# Patient Record
Sex: FEMALE | Race: WHITE
[De-identification: names, ages, dates, MRNs, and addresses within clinical notes are randomized per-mention and may not be internally consistent; named-entity substitution may affect disease eponyms.]

---

## 2018-08-23 ENCOUNTER — HOSPITAL ENCOUNTER (OUTPATIENT)
Dept: HOSPITAL 45 - C.MAMM | Age: 23
Discharge: HOME | End: 2018-08-23
Attending: PHYSICIAN ASSISTANT
Payer: COMMERCIAL

## 2018-08-23 DIAGNOSIS — R92.2: Primary | ICD-10-CM

## 2018-08-23 DIAGNOSIS — N64.4: ICD-10-CM

## 2018-08-23 NOTE — MAMMOGRAPHY REPORT
ULTRASOUND OF RIGHT BREAST: 8/23/2018

CLINICAL HISTORY: The patient reports a palpable lump in the right breast since the beginning of Augu
st. The lump was tender when she first felt it.  





COMPARISON: No prior exams were available for comparison.   



Findings: Real-time, high-resolution targeted ultrasound was performed of the area of the palpable cali
mp pointed out by the patient, in the right breast at approximately 3:00, 5 cm from the nipple.  Ultr
asound was also performed of the right 4 to 5:00 breast.  Sonographically normal tissue is seen in th
preet regions, without evidence of a mass or other suspicious sonographic abnormality.









IMPRESSION: ACR BI-RADS CATEGORY 1: NEGATIVE 

No suspicious sonographic abnormality at the site of the palpable lump and pain pointed out by the pa
tient in the right breast. There is no sonographic evidence of malignancy.  Recommend clinical follow
-up.



The patient was verbally notified of the results.



Kathleen Garcia M.D.  

ah/:8/23/2018 10:13:54  



Imaging Technologist: Kathleen Garcia MD, [facility]

letter sent: Normal 1/2  

BI-RADS Code: ACR BI-RADS Category 1: Negative